# Patient Record
Sex: MALE | Race: OTHER | HISPANIC OR LATINO | URBAN - METROPOLITAN AREA
[De-identification: names, ages, dates, MRNs, and addresses within clinical notes are randomized per-mention and may not be internally consistent; named-entity substitution may affect disease eponyms.]

---

## 2019-01-08 PROBLEM — Z00.00 ENCOUNTER FOR PREVENTIVE HEALTH EXAMINATION: Status: ACTIVE | Noted: 2019-01-08

## 2019-02-07 ENCOUNTER — OUTPATIENT (OUTPATIENT)
Dept: OUTPATIENT SERVICES | Facility: HOSPITAL | Age: 39
LOS: 1 days | End: 2019-02-07
Payer: MEDICAID

## 2019-02-07 ENCOUNTER — APPOINTMENT (OUTPATIENT)
Dept: NUCLEAR MEDICINE | Facility: HOSPITAL | Age: 39
End: 2019-02-07

## 2019-02-07 DIAGNOSIS — M17.31 UNILATERAL POST-TRAUMATIC OSTEOARTHRITIS, RIGHT KNEE: ICD-10-CM

## 2019-02-07 DIAGNOSIS — M16.11 UNILATERAL PRIMARY OSTEOARTHRITIS, RIGHT HIP: ICD-10-CM

## 2019-02-07 PROCEDURE — A9503: CPT

## 2019-02-07 PROCEDURE — 78315 BONE IMAGING 3 PHASE: CPT

## 2019-02-07 PROCEDURE — 78315 BONE IMAGING 3 PHASE: CPT | Mod: 26

## 2019-03-27 ENCOUNTER — OUTPATIENT (OUTPATIENT)
Dept: OUTPATIENT SERVICES | Facility: HOSPITAL | Age: 39
LOS: 1 days | End: 2019-03-27
Payer: MEDICAID

## 2019-03-27 VITALS
OXYGEN SATURATION: 98 % | SYSTOLIC BLOOD PRESSURE: 132 MMHG | HEIGHT: 74 IN | HEART RATE: 70 BPM | WEIGHT: 237 LBS | TEMPERATURE: 98 F | DIASTOLIC BLOOD PRESSURE: 80 MMHG | RESPIRATION RATE: 18 BRPM

## 2019-03-27 DIAGNOSIS — Z98.890 OTHER SPECIFIED POSTPROCEDURAL STATES: Chronic | ICD-10-CM

## 2019-03-27 DIAGNOSIS — Z01.818 ENCOUNTER FOR OTHER PREPROCEDURAL EXAMINATION: ICD-10-CM

## 2019-03-27 DIAGNOSIS — T84.84XA PAIN DUE TO INTERNAL ORTHOPEDIC PROSTHETIC DEVICES, IMPLANTS AND GRAFTS, INITIAL ENCOUNTER: ICD-10-CM

## 2019-03-27 PROCEDURE — G0463: CPT

## 2019-03-27 RX ORDER — SODIUM CHLORIDE 9 MG/ML
3 INJECTION INTRAMUSCULAR; INTRAVENOUS; SUBCUTANEOUS EVERY 8 HOURS
Qty: 0 | Refills: 0 | Status: DISCONTINUED | OUTPATIENT
Start: 2019-04-02 | End: 2019-04-04
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
## 2019-03-27 NOTE — H&P PST ADULT - NEGATIVE CARDIOVASCULAR SYMPTOMS
no chest pain/no peripheral edema/no orthopnea/no paroxysmal nocturnal dyspnea/no palpitations/no dyspnea on exertion/no claudication

## 2019-03-27 NOTE — H&P PST ADULT - NSICDXPROBLEM_GEN_ALL_CORE_FT
PROBLEM DIAGNOSES  Problem: Pain due to internal orthopedic prosthetic devices, implants and grafts, initial encounter  Assessment and Plan: removal of right femoral nail

## 2019-03-28 PROBLEM — I10 ESSENTIAL (PRIMARY) HYPERTENSION: Chronic | Status: ACTIVE | Noted: 2019-03-27

## 2019-04-01 ENCOUNTER — TRANSCRIPTION ENCOUNTER (OUTPATIENT)
Age: 39
End: 2019-04-01

## 2019-04-02 ENCOUNTER — RESULT REVIEW (OUTPATIENT)
Age: 39
End: 2019-04-02

## 2019-04-02 ENCOUNTER — INPATIENT (INPATIENT)
Facility: HOSPITAL | Age: 39
LOS: 1 days | Discharge: HOME | DRG: 481 | End: 2019-04-04
Attending: ORTHOPAEDIC SURGERY | Admitting: ORTHOPAEDIC SURGERY
Payer: MEDICAID

## 2019-04-02 VITALS
WEIGHT: 237 LBS | TEMPERATURE: 98 F | OXYGEN SATURATION: 100 % | HEIGHT: 74 IN | DIASTOLIC BLOOD PRESSURE: 86 MMHG | HEART RATE: 72 BPM | SYSTOLIC BLOOD PRESSURE: 139 MMHG | RESPIRATION RATE: 16 BRPM

## 2019-04-02 DIAGNOSIS — T84.84XA PAIN DUE TO INTERNAL ORTHOPEDIC PROSTHETIC DEVICES, IMPLANTS AND GRAFTS, INITIAL ENCOUNTER: ICD-10-CM

## 2019-04-02 DIAGNOSIS — Z98.890 OTHER SPECIFIED POSTPROCEDURAL STATES: Chronic | ICD-10-CM

## 2019-04-02 LAB
ABO RH CONFIRMATION: SIGNIFICANT CHANGE UP
ALLERGY+IMMUNOLOGY DIAG STUDY NOTE: SIGNIFICANT CHANGE UP
ANION GAP SERPL CALC-SCNC: 10 MMOL/L — SIGNIFICANT CHANGE UP (ref 5–17)
BASOPHILS # BLD AUTO: 0.04 K/UL — SIGNIFICANT CHANGE UP (ref 0–0.2)
BASOPHILS NFR BLD AUTO: 0.2 % — SIGNIFICANT CHANGE UP (ref 0–2)
BUN SERPL-MCNC: 15 MG/DL — SIGNIFICANT CHANGE UP (ref 7–18)
CALCIUM SERPL-MCNC: 7.7 MG/DL — LOW (ref 8.4–10.5)
CHLORIDE SERPL-SCNC: 109 MMOL/L — HIGH (ref 96–108)
CO2 SERPL-SCNC: 20 MMOL/L — LOW (ref 22–31)
CREAT SERPL-MCNC: 1.09 MG/DL — SIGNIFICANT CHANGE UP (ref 0.5–1.3)
EOSINOPHIL # BLD AUTO: 0.05 K/UL — SIGNIFICANT CHANGE UP (ref 0–0.5)
EOSINOPHIL NFR BLD AUTO: 0.3 % — SIGNIFICANT CHANGE UP (ref 0–6)
GLUCOSE SERPL-MCNC: 215 MG/DL — HIGH (ref 70–99)
HCT VFR BLD CALC: 32.3 % — LOW (ref 39–50)
HCT VFR BLD CALC: 34.2 % — LOW (ref 39–50)
HGB BLD-MCNC: 10.5 G/DL — LOW (ref 13–17)
HGB BLD-MCNC: 11.3 G/DL — LOW (ref 13–17)
IMM GRANULOCYTES NFR BLD AUTO: 0.3 % — SIGNIFICANT CHANGE UP (ref 0–1.5)
LYMPHOCYTES # BLD AUTO: 22 % — SIGNIFICANT CHANGE UP (ref 13–44)
LYMPHOCYTES # BLD AUTO: 3.75 K/UL — HIGH (ref 1–3.3)
MCHC RBC-ENTMCNC: 27.7 PG — SIGNIFICANT CHANGE UP (ref 27–34)
MCHC RBC-ENTMCNC: 28 PG — SIGNIFICANT CHANGE UP (ref 27–34)
MCHC RBC-ENTMCNC: 32.5 GM/DL — SIGNIFICANT CHANGE UP (ref 32–36)
MCHC RBC-ENTMCNC: 33 GM/DL — SIGNIFICANT CHANGE UP (ref 32–36)
MCV RBC AUTO: 84.7 FL — SIGNIFICANT CHANGE UP (ref 80–100)
MCV RBC AUTO: 85.2 FL — SIGNIFICANT CHANGE UP (ref 80–100)
MONOCYTES # BLD AUTO: 0.37 K/UL — SIGNIFICANT CHANGE UP (ref 0–0.9)
MONOCYTES NFR BLD AUTO: 2.2 % — SIGNIFICANT CHANGE UP (ref 2–14)
NEUTROPHILS # BLD AUTO: 12.79 K/UL — HIGH (ref 1.8–7.4)
NEUTROPHILS NFR BLD AUTO: 75 % — SIGNIFICANT CHANGE UP (ref 43–77)
NRBC # BLD: 0 /100 WBCS — SIGNIFICANT CHANGE UP (ref 0–0)
NRBC # BLD: 0 /100 WBCS — SIGNIFICANT CHANGE UP (ref 0–0)
PLATELET # BLD AUTO: 305 K/UL — SIGNIFICANT CHANGE UP (ref 150–400)
PLATELET # BLD AUTO: 344 K/UL — SIGNIFICANT CHANGE UP (ref 150–400)
POTASSIUM SERPL-MCNC: 4 MMOL/L — SIGNIFICANT CHANGE UP (ref 3.5–5.3)
POTASSIUM SERPL-SCNC: 4 MMOL/L — SIGNIFICANT CHANGE UP (ref 3.5–5.3)
RBC # BLD: 3.79 M/UL — LOW (ref 4.2–5.8)
RBC # BLD: 4.04 M/UL — LOW (ref 4.2–5.8)
RBC # FLD: 13.3 % — SIGNIFICANT CHANGE UP (ref 10.3–14.5)
RBC # FLD: 13.5 % — SIGNIFICANT CHANGE UP (ref 10.3–14.5)
SODIUM SERPL-SCNC: 139 MMOL/L — SIGNIFICANT CHANGE UP (ref 135–145)
WBC # BLD: 10.15 K/UL — SIGNIFICANT CHANGE UP (ref 3.8–10.5)
WBC # BLD: 17.05 K/UL — HIGH (ref 3.8–10.5)
WBC # FLD AUTO: 10.15 K/UL — SIGNIFICANT CHANGE UP (ref 3.8–10.5)
WBC # FLD AUTO: 17.05 K/UL — HIGH (ref 3.8–10.5)

## 2019-04-02 PROCEDURE — 27356 REMOVE FEMUR LESION/GRAFT: CPT | Mod: AS,59,RT

## 2019-04-02 PROCEDURE — 77071 MNL APPL STRS JT RADIOGRAPHY: CPT

## 2019-04-02 PROCEDURE — 27372 REMOVAL OF FOREIGN BODY: CPT | Mod: AS,59,RT

## 2019-04-02 PROCEDURE — 27087 REMOVE HIP FOREIGN BODY: CPT | Mod: AS,59,RT

## 2019-04-02 PROCEDURE — 88300 SURGICAL PATH GROSS: CPT | Mod: 26

## 2019-04-02 PROCEDURE — 76000 FLUOROSCOPY <1 HR PHYS/QHP: CPT | Mod: 26

## 2019-04-02 PROCEDURE — 20680 REMOVAL OF IMPLANT DEEP: CPT | Mod: AS,59,RT

## 2019-04-02 PROCEDURE — 27067 REMOVE/GRAFT HIP BONE LESION: CPT | Mod: AS,RT

## 2019-04-02 RX ORDER — OXYCODONE AND ACETAMINOPHEN 5; 325 MG/1; MG/1
2 TABLET ORAL EVERY 6 HOURS
Qty: 0 | Refills: 0 | Status: DISCONTINUED | OUTPATIENT
Start: 2019-04-02 | End: 2019-04-04

## 2019-04-02 RX ORDER — HYDROMORPHONE HYDROCHLORIDE 2 MG/ML
0.5 INJECTION INTRAMUSCULAR; INTRAVENOUS; SUBCUTANEOUS
Qty: 0 | Refills: 0 | Status: DISCONTINUED | OUTPATIENT
Start: 2019-04-02 | End: 2019-04-02

## 2019-04-02 RX ORDER — SODIUM CHLORIDE 9 MG/ML
1000 INJECTION INTRAMUSCULAR; INTRAVENOUS; SUBCUTANEOUS
Qty: 0 | Refills: 0 | Status: DISCONTINUED | OUTPATIENT
Start: 2019-04-02 | End: 2019-04-03

## 2019-04-02 RX ORDER — AMLODIPINE BESYLATE 2.5 MG/1
1 TABLET ORAL
Qty: 0 | Refills: 0 | COMMUNITY

## 2019-04-02 RX ORDER — OXYCODONE AND ACETAMINOPHEN 5; 325 MG/1; MG/1
1 TABLET ORAL EVERY 4 HOURS
Qty: 0 | Refills: 0 | Status: DISCONTINUED | OUTPATIENT
Start: 2019-04-02 | End: 2019-04-04

## 2019-04-02 RX ORDER — KETOROLAC TROMETHAMINE 30 MG/ML
15 SYRINGE (ML) INJECTION EVERY 6 HOURS
Qty: 0 | Refills: 0 | Status: DISCONTINUED | OUTPATIENT
Start: 2019-04-02 | End: 2019-04-04

## 2019-04-02 RX ORDER — SODIUM CHLORIDE 9 MG/ML
1000 INJECTION, SOLUTION INTRAVENOUS
Qty: 0 | Refills: 0 | Status: DISCONTINUED | OUTPATIENT
Start: 2019-04-02 | End: 2019-04-02

## 2019-04-02 RX ORDER — DEXTROSE MONOHYDRATE, SODIUM CHLORIDE, AND POTASSIUM CHLORIDE 50; .745; 4.5 G/1000ML; G/1000ML; G/1000ML
1000 INJECTION, SOLUTION INTRAVENOUS
Qty: 0 | Refills: 0 | Status: DISCONTINUED | OUTPATIENT
Start: 2019-04-02 | End: 2019-04-03

## 2019-04-02 RX ORDER — ATORVASTATIN CALCIUM 80 MG/1
1 TABLET, FILM COATED ORAL
Qty: 0 | Refills: 0 | COMMUNITY

## 2019-04-02 RX ORDER — ONDANSETRON 8 MG/1
4 TABLET, FILM COATED ORAL EVERY 6 HOURS
Qty: 0 | Refills: 0 | Status: DISCONTINUED | OUTPATIENT
Start: 2019-04-02 | End: 2019-04-04

## 2019-04-02 RX ADMIN — SODIUM CHLORIDE 3 MILLILITER(S): 9 INJECTION INTRAMUSCULAR; INTRAVENOUS; SUBCUTANEOUS at 08:12

## 2019-04-02 RX ADMIN — HYDROMORPHONE HYDROCHLORIDE 0.5 MILLIGRAM(S): 2 INJECTION INTRAMUSCULAR; INTRAVENOUS; SUBCUTANEOUS at 18:40

## 2019-04-02 RX ADMIN — Medication 15 MILLIGRAM(S): at 18:40

## 2019-04-02 RX ADMIN — OXYCODONE AND ACETAMINOPHEN 2 TABLET(S): 5; 325 TABLET ORAL at 23:52

## 2019-04-02 RX ADMIN — DEXTROSE MONOHYDRATE, SODIUM CHLORIDE, AND POTASSIUM CHLORIDE 150 MILLILITER(S): 50; .745; 4.5 INJECTION, SOLUTION INTRAVENOUS at 23:52

## 2019-04-02 RX ADMIN — SODIUM CHLORIDE 3 MILLILITER(S): 9 INJECTION INTRAMUSCULAR; INTRAVENOUS; SUBCUTANEOUS at 23:23

## 2019-04-02 RX ADMIN — HYDROMORPHONE HYDROCHLORIDE 0.5 MILLIGRAM(S): 2 INJECTION INTRAMUSCULAR; INTRAVENOUS; SUBCUTANEOUS at 18:25

## 2019-04-02 RX ADMIN — Medication 15 MILLIGRAM(S): at 18:25

## 2019-04-02 NOTE — ASU DISCHARGE PLAN (ADULT/PEDIATRIC) - CALL YOUR DOCTOR IF YOU HAVE ANY OF THE FOLLOWING:
Wound/Surgical Site with redness, or foul smelling discharge or pus/Fever greater than (need to indicate Fahrenheit or Celsius)/Numbness, tingling, color or temperature change to extremity/Nausea and vomiting that does not stop/Unable to urinate/Bleeding that does not stop/Swelling that gets worse/Pain not relieved by Medications

## 2019-04-02 NOTE — ASU DISCHARGE PLAN (ADULT/PEDIATRIC) - CARE PROVIDER_API CALL
Dano Angel (MD)  Orthopaedic Surgery; Sports Medicine  [address] [facility], [address]  [place] [id number]  Phone: [phone]  Fax: [phone]  Follow Up Time:

## 2019-04-02 NOTE — BRIEF OPERATIVE NOTE - NSICDXBRIEFPROCEDURE_GEN_ALL_CORE_FT
PROCEDURES:  Removal of internal fixation device from right femoral shaft, open approach 02-Apr-2019 16:23:12  Wilver Kirby

## 2019-04-02 NOTE — ASU DISCHARGE PLAN (ADULT/PEDIATRIC) - ASU DC SPECIAL INSTRUCTIONSFT
Follow-up with Dr. Angel in ONE WEEK at [phone]     Non-weight bearing of the right leg with crutches

## 2019-04-03 LAB
ALBUMIN SERPL ELPH-MCNC: 2.8 G/DL — LOW (ref 3.5–5)
ALBUMIN SERPL ELPH-MCNC: 2.9 G/DL — LOW (ref 3.5–5)
ALP SERPL-CCNC: 45 U/L — SIGNIFICANT CHANGE UP (ref 40–120)
ALP SERPL-CCNC: 48 U/L — SIGNIFICANT CHANGE UP (ref 40–120)
ALT FLD-CCNC: 31 U/L DA — SIGNIFICANT CHANGE UP (ref 10–60)
ALT FLD-CCNC: 34 U/L DA — SIGNIFICANT CHANGE UP (ref 10–60)
ANION GAP SERPL CALC-SCNC: 4 MMOL/L — LOW (ref 5–17)
ANION GAP SERPL CALC-SCNC: 5 MMOL/L — SIGNIFICANT CHANGE UP (ref 5–17)
ANION GAP SERPL CALC-SCNC: 6 MMOL/L — SIGNIFICANT CHANGE UP (ref 5–17)
APPEARANCE UR: CLEAR — SIGNIFICANT CHANGE UP
AST SERPL-CCNC: 34 U/L — SIGNIFICANT CHANGE UP (ref 10–40)
AST SERPL-CCNC: 36 U/L — SIGNIFICANT CHANGE UP (ref 10–40)
BASOPHILS # BLD AUTO: 0.02 K/UL — SIGNIFICANT CHANGE UP (ref 0–0.2)
BASOPHILS NFR BLD AUTO: 0.1 % — SIGNIFICANT CHANGE UP (ref 0–2)
BILIRUB SERPL-MCNC: 0.3 MG/DL — SIGNIFICANT CHANGE UP (ref 0.2–1.2)
BILIRUB SERPL-MCNC: 0.3 MG/DL — SIGNIFICANT CHANGE UP (ref 0.2–1.2)
BILIRUB UR-MCNC: NEGATIVE — SIGNIFICANT CHANGE UP
BUN SERPL-MCNC: 12 MG/DL — SIGNIFICANT CHANGE UP (ref 7–18)
BUN SERPL-MCNC: 13 MG/DL — SIGNIFICANT CHANGE UP (ref 7–18)
BUN SERPL-MCNC: 13 MG/DL — SIGNIFICANT CHANGE UP (ref 7–18)
CALCIUM SERPL-MCNC: 7.4 MG/DL — LOW (ref 8.4–10.5)
CALCIUM SERPL-MCNC: 7.7 MG/DL — LOW (ref 8.4–10.5)
CALCIUM SERPL-MCNC: 7.7 MG/DL — LOW (ref 8.4–10.5)
CHLORIDE SERPL-SCNC: 108 MMOL/L — SIGNIFICANT CHANGE UP (ref 96–108)
CHLORIDE SERPL-SCNC: 108 MMOL/L — SIGNIFICANT CHANGE UP (ref 96–108)
CHLORIDE SERPL-SCNC: 113 MMOL/L — HIGH (ref 96–108)
CHOLEST SERPL-MCNC: 137 MG/DL — SIGNIFICANT CHANGE UP (ref 10–199)
CK MB BLD-MCNC: 0.2 % — SIGNIFICANT CHANGE UP (ref 0–3.5)
CK MB BLD-MCNC: 0.3 % — SIGNIFICANT CHANGE UP (ref 0–3.5)
CK MB BLD-MCNC: 0.4 % — SIGNIFICANT CHANGE UP (ref 0–3.5)
CK MB CFR SERPL CALC: 3.8 NG/ML — HIGH (ref 0–3.6)
CK MB CFR SERPL CALC: 4.9 NG/ML — HIGH (ref 0–3.6)
CK MB CFR SERPL CALC: 5.1 NG/ML — HIGH (ref 0–3.6)
CK SERPL-CCNC: 1370 U/L — HIGH (ref 35–232)
CK SERPL-CCNC: 1459 U/L — HIGH (ref 35–232)
CK SERPL-CCNC: 1539 U/L — HIGH (ref 35–232)
CO2 SERPL-SCNC: 24 MMOL/L — SIGNIFICANT CHANGE UP (ref 22–31)
CO2 SERPL-SCNC: 25 MMOL/L — SIGNIFICANT CHANGE UP (ref 22–31)
CO2 SERPL-SCNC: 25 MMOL/L — SIGNIFICANT CHANGE UP (ref 22–31)
COLOR SPEC: YELLOW — SIGNIFICANT CHANGE UP
CREAT SERPL-MCNC: 0.79 MG/DL — SIGNIFICANT CHANGE UP (ref 0.5–1.3)
CREAT SERPL-MCNC: 0.9 MG/DL — SIGNIFICANT CHANGE UP (ref 0.5–1.3)
CREAT SERPL-MCNC: 1 MG/DL — SIGNIFICANT CHANGE UP (ref 0.5–1.3)
DIFF PNL FLD: NEGATIVE — SIGNIFICANT CHANGE UP
EOSINOPHIL # BLD AUTO: 0 K/UL — SIGNIFICANT CHANGE UP (ref 0–0.5)
EOSINOPHIL NFR BLD AUTO: 0 % — SIGNIFICANT CHANGE UP (ref 0–6)
GLUCOSE SERPL-MCNC: 104 MG/DL — HIGH (ref 70–99)
GLUCOSE SERPL-MCNC: 125 MG/DL — HIGH (ref 70–99)
GLUCOSE SERPL-MCNC: 156 MG/DL — HIGH (ref 70–99)
GLUCOSE UR QL: NEGATIVE — SIGNIFICANT CHANGE UP
HCT VFR BLD CALC: 25.2 % — LOW (ref 39–50)
HCT VFR BLD CALC: 26.6 % — LOW (ref 39–50)
HCT VFR BLD CALC: 28.3 % — LOW (ref 39–50)
HDLC SERPL-MCNC: 23 MG/DL — LOW
HGB BLD-MCNC: 8.2 G/DL — LOW (ref 13–17)
HGB BLD-MCNC: 8.7 G/DL — LOW (ref 13–17)
HGB BLD-MCNC: 9.3 G/DL — LOW (ref 13–17)
IMM GRANULOCYTES NFR BLD AUTO: 0.4 % — SIGNIFICANT CHANGE UP (ref 0–1.5)
KETONES UR-MCNC: NEGATIVE — SIGNIFICANT CHANGE UP
LEUKOCYTE ESTERASE UR-ACNC: NEGATIVE — SIGNIFICANT CHANGE UP
LIPID PNL WITH DIRECT LDL SERPL: 97 MG/DL — SIGNIFICANT CHANGE UP
LYMPHOCYTES # BLD AUTO: 16 % — SIGNIFICANT CHANGE UP (ref 13–44)
LYMPHOCYTES # BLD AUTO: 2.36 K/UL — SIGNIFICANT CHANGE UP (ref 1–3.3)
MAGNESIUM SERPL-MCNC: 1.7 MG/DL — SIGNIFICANT CHANGE UP (ref 1.6–2.6)
MAGNESIUM SERPL-MCNC: 1.8 MG/DL — SIGNIFICANT CHANGE UP (ref 1.6–2.6)
MCHC RBC-ENTMCNC: 27.4 PG — SIGNIFICANT CHANGE UP (ref 27–34)
MCHC RBC-ENTMCNC: 27.5 PG — SIGNIFICANT CHANGE UP (ref 27–34)
MCHC RBC-ENTMCNC: 27.6 PG — SIGNIFICANT CHANGE UP (ref 27–34)
MCHC RBC-ENTMCNC: 32.5 GM/DL — SIGNIFICANT CHANGE UP (ref 32–36)
MCHC RBC-ENTMCNC: 32.7 GM/DL — SIGNIFICANT CHANGE UP (ref 32–36)
MCHC RBC-ENTMCNC: 32.9 GM/DL — SIGNIFICANT CHANGE UP (ref 32–36)
MCV RBC AUTO: 83.2 FL — SIGNIFICANT CHANGE UP (ref 80–100)
MCV RBC AUTO: 84.2 FL — SIGNIFICANT CHANGE UP (ref 80–100)
MCV RBC AUTO: 84.8 FL — SIGNIFICANT CHANGE UP (ref 80–100)
MONOCYTES # BLD AUTO: 1.43 K/UL — HIGH (ref 0–0.9)
MONOCYTES NFR BLD AUTO: 9.7 % — SIGNIFICANT CHANGE UP (ref 2–14)
NEUTROPHILS # BLD AUTO: 10.88 K/UL — HIGH (ref 1.8–7.4)
NEUTROPHILS NFR BLD AUTO: 73.8 % — SIGNIFICANT CHANGE UP (ref 43–77)
NITRITE UR-MCNC: NEGATIVE — SIGNIFICANT CHANGE UP
NRBC # BLD: 0 /100 WBCS — SIGNIFICANT CHANGE UP (ref 0–0)
PH UR: 8 — SIGNIFICANT CHANGE UP (ref 5–8)
PHOSPHATE SERPL-MCNC: 1.4 MG/DL — LOW (ref 2.5–4.5)
PHOSPHATE SERPL-MCNC: 1.9 MG/DL — LOW (ref 2.5–4.5)
PLATELET # BLD AUTO: 221 K/UL — SIGNIFICANT CHANGE UP (ref 150–400)
PLATELET # BLD AUTO: 268 K/UL — SIGNIFICANT CHANGE UP (ref 150–400)
PLATELET # BLD AUTO: 273 K/UL — SIGNIFICANT CHANGE UP (ref 150–400)
POTASSIUM SERPL-MCNC: 4 MMOL/L — SIGNIFICANT CHANGE UP (ref 3.5–5.3)
POTASSIUM SERPL-MCNC: 4.3 MMOL/L — SIGNIFICANT CHANGE UP (ref 3.5–5.3)
POTASSIUM SERPL-MCNC: 4.5 MMOL/L — SIGNIFICANT CHANGE UP (ref 3.5–5.3)
POTASSIUM SERPL-SCNC: 4 MMOL/L — SIGNIFICANT CHANGE UP (ref 3.5–5.3)
POTASSIUM SERPL-SCNC: 4.3 MMOL/L — SIGNIFICANT CHANGE UP (ref 3.5–5.3)
POTASSIUM SERPL-SCNC: 4.5 MMOL/L — SIGNIFICANT CHANGE UP (ref 3.5–5.3)
PROT SERPL-MCNC: 5.5 G/DL — LOW (ref 6–8.3)
PROT SERPL-MCNC: 5.9 G/DL — LOW (ref 6–8.3)
PROT UR-MCNC: NEGATIVE — SIGNIFICANT CHANGE UP
RBC # BLD: 2.97 M/UL — LOW (ref 4.2–5.8)
RBC # BLD: 3.16 M/UL — LOW (ref 4.2–5.8)
RBC # BLD: 3.4 M/UL — LOW (ref 4.2–5.8)
RBC # FLD: 13.3 % — SIGNIFICANT CHANGE UP (ref 10.3–14.5)
RBC # FLD: 13.4 % — SIGNIFICANT CHANGE UP (ref 10.3–14.5)
RBC # FLD: 13.7 % — SIGNIFICANT CHANGE UP (ref 10.3–14.5)
SODIUM SERPL-SCNC: 138 MMOL/L — SIGNIFICANT CHANGE UP (ref 135–145)
SODIUM SERPL-SCNC: 138 MMOL/L — SIGNIFICANT CHANGE UP (ref 135–145)
SODIUM SERPL-SCNC: 142 MMOL/L — SIGNIFICANT CHANGE UP (ref 135–145)
SP GR SPEC: 1.01 — SIGNIFICANT CHANGE UP (ref 1.01–1.02)
TOTAL CHOLESTEROL/HDL RATIO MEASUREMENT: 6 RATIO — SIGNIFICANT CHANGE UP (ref 3.4–9.6)
TRIGL SERPL-MCNC: 84 MG/DL — SIGNIFICANT CHANGE UP (ref 10–149)
TROPONIN I SERPL-MCNC: <0.015 NG/ML — SIGNIFICANT CHANGE UP (ref 0–0.04)
TROPONIN I SERPL-MCNC: <0.015 NG/ML — SIGNIFICANT CHANGE UP (ref 0–0.04)
TSH SERPL-MCNC: 0.39 UU/ML — SIGNIFICANT CHANGE UP (ref 0.34–4.82)
UROBILINOGEN FLD QL: NEGATIVE — SIGNIFICANT CHANGE UP
WBC # BLD: 11.63 K/UL — HIGH (ref 3.8–10.5)
WBC # BLD: 14.75 K/UL — HIGH (ref 3.8–10.5)
WBC # BLD: 16.51 K/UL — HIGH (ref 3.8–10.5)
WBC # FLD AUTO: 11.63 K/UL — HIGH (ref 3.8–10.5)
WBC # FLD AUTO: 14.75 K/UL — HIGH (ref 3.8–10.5)
WBC # FLD AUTO: 16.51 K/UL — HIGH (ref 3.8–10.5)

## 2019-04-03 PROCEDURE — 99222 1ST HOSP IP/OBS MODERATE 55: CPT

## 2019-04-03 RX ORDER — ENOXAPARIN SODIUM 100 MG/ML
40 INJECTION SUBCUTANEOUS EVERY 24 HOURS
Qty: 0 | Refills: 0 | Status: DISCONTINUED | OUTPATIENT
Start: 2019-04-03 | End: 2019-04-04

## 2019-04-03 RX ORDER — ENOXAPARIN SODIUM 100 MG/ML
40 INJECTION SUBCUTANEOUS EVERY 24 HOURS
Qty: 0 | Refills: 0 | Status: DISCONTINUED | OUTPATIENT
Start: 2019-04-03 | End: 2019-04-03

## 2019-04-03 RX ORDER — FUROSEMIDE 40 MG
20 TABLET ORAL ONCE
Qty: 0 | Refills: 0 | Status: COMPLETED | OUTPATIENT
Start: 2019-04-03 | End: 2019-04-03

## 2019-04-03 RX ORDER — SODIUM CHLORIDE 9 MG/ML
1000 INJECTION, SOLUTION INTRAVENOUS
Qty: 0 | Refills: 0 | Status: DISCONTINUED | OUTPATIENT
Start: 2019-04-03 | End: 2019-04-04

## 2019-04-03 RX ORDER — POTASSIUM PHOSPHATE, MONOBASIC POTASSIUM PHOSPHATE, DIBASIC 236; 224 MG/ML; MG/ML
30 INJECTION, SOLUTION INTRAVENOUS ONCE
Qty: 0 | Refills: 0 | Status: DISCONTINUED | OUTPATIENT
Start: 2019-04-03 | End: 2019-04-03

## 2019-04-03 RX ORDER — SODIUM CHLORIDE 9 MG/ML
1000 INJECTION, SOLUTION INTRAVENOUS
Qty: 0 | Refills: 0 | Status: DISCONTINUED | OUTPATIENT
Start: 2019-04-04 | End: 2019-04-04

## 2019-04-03 RX ORDER — POTASSIUM PHOSPHATE, MONOBASIC POTASSIUM PHOSPHATE, DIBASIC 236; 224 MG/ML; MG/ML
15 INJECTION, SOLUTION INTRAVENOUS ONCE
Qty: 0 | Refills: 0 | Status: COMPLETED | OUTPATIENT
Start: 2019-04-03 | End: 2019-04-03

## 2019-04-03 RX ORDER — SODIUM CHLORIDE 9 MG/ML
1000 INJECTION INTRAMUSCULAR; INTRAVENOUS; SUBCUTANEOUS ONCE
Qty: 0 | Refills: 0 | Status: COMPLETED | OUTPATIENT
Start: 2019-04-03 | End: 2019-04-03

## 2019-04-03 RX ADMIN — OXYCODONE AND ACETAMINOPHEN 2 TABLET(S): 5; 325 TABLET ORAL at 21:09

## 2019-04-03 RX ADMIN — SODIUM CHLORIDE 3 MILLILITER(S): 9 INJECTION INTRAMUSCULAR; INTRAVENOUS; SUBCUTANEOUS at 21:09

## 2019-04-03 RX ADMIN — OXYCODONE AND ACETAMINOPHEN 2 TABLET(S): 5; 325 TABLET ORAL at 15:11

## 2019-04-03 RX ADMIN — OXYCODONE AND ACETAMINOPHEN 2 TABLET(S): 5; 325 TABLET ORAL at 08:02

## 2019-04-03 RX ADMIN — OXYCODONE AND ACETAMINOPHEN 2 TABLET(S): 5; 325 TABLET ORAL at 01:25

## 2019-04-03 RX ADMIN — Medication 20 MILLIGRAM(S): at 20:18

## 2019-04-03 RX ADMIN — SODIUM CHLORIDE 3 MILLILITER(S): 9 INJECTION INTRAMUSCULAR; INTRAVENOUS; SUBCUTANEOUS at 06:31

## 2019-04-03 RX ADMIN — OXYCODONE AND ACETAMINOPHEN 2 TABLET(S): 5; 325 TABLET ORAL at 06:39

## 2019-04-03 RX ADMIN — SODIUM CHLORIDE 1000 MILLILITER(S): 9 INJECTION INTRAMUSCULAR; INTRAVENOUS; SUBCUTANEOUS at 10:31

## 2019-04-03 RX ADMIN — Medication 15 MILLIGRAM(S): at 04:42

## 2019-04-03 RX ADMIN — ENOXAPARIN SODIUM 40 MILLIGRAM(S): 100 INJECTION SUBCUTANEOUS at 09:18

## 2019-04-03 RX ADMIN — SODIUM CHLORIDE 125 MILLILITER(S): 9 INJECTION, SOLUTION INTRAVENOUS at 20:18

## 2019-04-03 RX ADMIN — OXYCODONE AND ACETAMINOPHEN 2 TABLET(S): 5; 325 TABLET ORAL at 20:18

## 2019-04-03 RX ADMIN — OXYCODONE AND ACETAMINOPHEN 2 TABLET(S): 5; 325 TABLET ORAL at 14:13

## 2019-04-03 RX ADMIN — Medication 15 MILLIGRAM(S): at 02:55

## 2019-04-03 RX ADMIN — SODIUM CHLORIDE 125 MILLILITER(S): 9 INJECTION, SOLUTION INTRAVENOUS at 06:30

## 2019-04-03 RX ADMIN — SODIUM CHLORIDE 3 MILLILITER(S): 9 INJECTION INTRAMUSCULAR; INTRAVENOUS; SUBCUTANEOUS at 14:09

## 2019-04-03 RX ADMIN — POTASSIUM PHOSPHATE, MONOBASIC POTASSIUM PHOSPHATE, DIBASIC 62.5 MILLIMOLE(S): 236; 224 INJECTION, SOLUTION INTRAVENOUS at 06:30

## 2019-04-03 RX ADMIN — Medication 20 MILLIGRAM(S): at 16:28

## 2019-04-03 NOTE — PROGRESS NOTE ADULT - ATTENDING COMMENTS
Patient seen/evaluated at bedside 4/3/2019. I agree with the resident progress note/outlined plan of care. My independent findings and conclusions are documented.

## 2019-04-03 NOTE — PHYSICAL THERAPY INITIAL EVALUATION ADULT - GENERAL OBSERVATIONS, REHAB EVAL
pt aox4, s/p R hip removal of hardware, resident MD rounding clear to mobilize, crutch dispense, safe I crutch adl and ambulation NWB RLE

## 2019-04-03 NOTE — PROGRESS NOTE ADULT - ASSESSMENT
A 37 YO male, PMH of HTN ( recently diagnosed 7-8 months back on Norvasc ), HLD ( X 2 years) admitted to [facility] for removal of internal fixation device from right femoral shaft, internal medicine consult called for persistent tachycardia.     1) Tachycardia  sinus tach likely related to symptomatic anemia from acute blood loss and pain, now resolved  TTE pending  Patient was transfused 1U PRBC today, check PT CBC  no evidence of active bleeding now      2) Acute blood loss anemia  plan as above    3) Rhabdomyolysis  CK trending up to 1459, c/w IV Hydration with LR, Trend Ck QD. no evidence of YULIANA, check UA    4) Post op Care  pain management and further care per ortho       5) DVT prophylaxis  recommend to start DVT ppx if there is no contraindication from ortho perspective.    Plan discussed with DR Marin, pt and surgical house officer Dr Russ.

## 2019-04-03 NOTE — PHYSICAL THERAPY INITIAL EVALUATION ADULT - IMPAIRMENTS FOUND, PT EVAL
ROM/aerobic capacity/endurance/gait, locomotion, and balance/muscle strength/joint integrity and mobility/gross motor

## 2019-04-03 NOTE — CHART NOTE - NSCHARTNOTEFT_GEN_A_CORE
Pt with tachycardia, P150 on Telemetry  Pt denies chest pain    EKG - , possible inferior infarct  Medical consult now  Obtain CBC, CMP, Cardiac enzyme, Ca, Mg, Phos  Close monitoring

## 2019-04-03 NOTE — PROGRESS NOTE ADULT - SUBJECTIVE AND OBJECTIVE BOX
Pt Name: JUAN NUNEZREYES  MRN: [id number]    ORTHOPEDICS    Orthopedic diagnosis: s/p removal hardware rt femur POD#1, post-operative acute blood loss anemia    38yMaleHPI:  pt with minimal pain of right hip, rated 2/10 right now after receiving pain meds.  overnight, medicine ordered ECHO for tachycardia and 1unit prbc transfusion for h/h drop.  This am hemoglobin 8.6. On LR IV fluids with NS bolus for rhabdomyolysis, CK was 1459.  Pt denies Chest pain, SOB, dyspnea, paresthesias, N/V/D, abdominal pain, syncope, or pain anywhere else.   Denies h/a, dizziness.    T(C): 37.2 (04-03-19 @ 06:45), Max: 37.2 (04-03-19 @ 06:45)  HR: 112 (04-03-19 @ 06:45) (91 - 114)  BP: 120/61 (04-03-19 @ 06:45) (103/61 - 135/83)  RR: 18 (04-03-19 @ 06:45) (12 - 19)  SpO2: 98% (04-03-19 @ 06:45) (95% - 100%)    PHYSICAL EXAM:    Gen: well developed, well nourished, comfortable  Musculoskeletal:    [ X ] RIGHT    [   ] LEFT       [   ] UPPER EXTREMITY   [X ] LOWER EXTREMITY  Rt hip dressing c/d/i  skin pink and warm throughout  no drainage  compartments soft  no ct calves soft  2+pulses nvi  silt with good <2sec cap refill  neg homans sign      LABS:                        8.7    14.75 )-----------( 273      ( 03 Apr 2019 07:20 )             26.6     04-03    138  |  108  |  13  ----------------------------<  125<H>  4.3   |  25  |  0.90    Ca    7.7<L>      03 Apr 2019 07:20  Phos  1.9     04-03  Mg     1.8     04-03    TPro  5.5<L>  /  Alb  2.8<L>  /  TBili  0.3  /  DBili  x   /  AST  34  /  ALT  31  /  AlkPhos  45  04-03    serum creatinine kinase 1459 (4/3/19 @6am)<-1370 (4/3/19 @3am)    IMPRESSION: Pt is a  38y Male with s/p removal hardware rt femur with intermittent tachycardia, acute loss anemia post-operative    PLAN:  -  1unit prbc transfusion ordered for this am  -  IV fluid hydration. NS bolus 1L ordered. with LR@125cc/hr order  -  DVT prophylaxis with lovenox 40mg sc daily  -  Daily PT- NWB of the Rt hip with crutches  -  Case d/w -> agrees with plan  -  Pt is orthopedically stable for discharge.   -  TTE ordered for today as per medicine for tachycardia  -  2pm CBC ordered, possible transfusion later today Pt Name: JUAN NUNEZREYES  MRN: [id number]    ORTHOPEDICS    Orthopedic diagnosis: s/p removal hardware rt femur POD#1, post-operative acute blood loss anemia    38yMaleHPI:  pt with minimal pain of right hip, rated 2/10 right now after receiving pain meds.  overnight, medicine ordered ECHO for tachycardia and 1unit prbc transfusion for h/h drop.  This am hemoglobin 8.6. On LR IV fluids with NS bolus for rhabdomyolysis, CK was 1459.  Pt denies Chest pain, SOB, dyspnea, paresthesias, N/V/D, abdominal pain, syncope, or pain anywhere else.   Denies h/a, dizziness.    T(C): 37.2 (04-03-19 @ 06:45), Max: 37.2 (04-03-19 @ 06:45)  HR: 112 (04-03-19 @ 06:45) (91 - 114)  BP: 120/61 (04-03-19 @ 06:45) (103/61 - 135/83)  RR: 18 (04-03-19 @ 06:45) (12 - 19)  SpO2: 98% (04-03-19 @ 06:45) (95% - 100%)    PHYSICAL EXAM:    Gen: well developed, well nourished, comfortable  Musculoskeletal:    [ X ] RIGHT    [   ] LEFT       [   ] UPPER EXTREMITY   [X ] LOWER EXTREMITY  Rt hip dressing c/d/i  skin pink and warm throughout  no drainage  compartments soft  no ct calves soft  2+pulses nvi  silt with good <2sec cap refill  neg homans sign      LABS:                        8.7    14.75 )-----------( 273      ( 03 Apr 2019 07:20 )             26.6     04-03    138  |  108  |  13  ----------------------------<  125<H>  4.3   |  25  |  0.90    Ca    7.7<L>      03 Apr 2019 07:20  Phos  1.9     04-03  Mg     1.8     04-03    TPro  5.5<L>  /  Alb  2.8<L>  /  TBili  0.3  /  DBili  x   /  AST  34  /  ALT  31  /  AlkPhos  45  04-03    serum creatinine kinase 1459 (4/3/19 @6am)<-1370 (4/3/19 @3am)    IMPRESSION: Pt is a  38y Male with s/p removal hardware rt femur with intermittent tachycardia, acute loss anemia post-operative, rhabdomyolysis    PLAN:  -  Anemia, acute blood loss: 1unit prbc transfusion ordered for this am  -  Rhabdo:  IV fluid hydration. NS bolus 1L ordered. with LR@125cc/hr order  -  DVT prophylaxis with lovenox 40mg sc daily  -  Daily PT- NWB of the Rt hip with crutches  -  Case d/w -> agrees with plan  -  Pt is orthopedically stable for discharge.   -  TTE ordered for today as per medicine for tachycardia  -  2pm CBC ordered, possible transfusion later today  - monitor creatinine level and serum CK and h/h  -
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
## 2019-04-03 NOTE — CONSULT NOTE ADULT - ASSESSMENT
A 37 YO male, PMH of HTN ( recently diagnosed 7-8 months back on Norvasc ), HLD ( X 2 years) admitted to [facility] for removal of internal fixation device from right femoral shaft, internal medicine consult called for persistent tachycardia.     1) Tachycardia  -Pt has sinus tachycardia ranging from 110-140, with HR trending down to 90s while asleep  -could be secondary to multiple etiologies- Post op pain, due to acute blood loss anemia, post anesthesia medications S/E, arrythmia or sepsis.   -EKG s/o sinus tachycardia @ 102 , with low voltage QRS with Q wave in lead III, non specific T wave changes in inferior leads which is unchanged from previous EKG done in March 2019.  - Cardiac enzymes X1 negative, will trend CE  - monitor on telemetry  - Given pt has EBL of 1500cc may be he is volume depleted, received crystalloids but still tachycardic, would recommend to monitor Q4h for now and if there is persistent drop in H/H, may benefit from PRBC transfusion.  - TSH- WNL  - Will get Echo, lipid profile  - would hold off any ASA, statin or beta blocker at this time as it do not seem to be related to ischemia.  - Would defer doing D dimer which is expected to be elevated post op and clinically pt has no tachypnea or hypoxia.       2) Acute blood loss anemia  Pt has Hb of 14 in March 2019 and today Hb was 9.3, due to acute blood loss intra op  Also there may be component of dilutional due to IV fluids administartion.  recommend to check CBC in 4 hours and if there is drop in H/H or if there is no change in H/.H and pt remains tachycardic , would consider transfusing 1 PRBC.    3) Rhabdomyolysis  Pt has elevated CK levels of 1370.  would hydrate the pt with LR @ 125 cc/hr  check Ck levels in AM    4) Post op Care  pain management and further care per ortho       5) DVT prophylaxis  recommend to start DVT ppx if there is no contraindication from ortho perspective. A 39 YO male, PMH of HTN ( recently diagnosed 7-8 months back on Norvasc ), HLD ( X 2 years) admitted to [facility] for removal of internal fixation device from right femoral shaft, internal medicine consult called for persistent tachycardia.     1) Tachycardia  -Pt has sinus tachycardia ranging from 110-140, with HR trending down to 90s while asleep  -could be secondary to multiple etiologies- Post op pain, due to acute blood loss anemia, post anesthesia medications S/E, arrythmia or sepsis.   -EKG s/o sinus tachycardia @ 102 , with low voltage QRS with Q wave in lead III, non specific T wave changes in inferior leads which is unchanged from previous EKG done in March 2019.  - Cardiac enzymes X1 negative, will trend CE  - monitor on telemetry  - Given pt has EBL of 1500cc may be he is volume depleted, received crystalloids but still tachycardic, would recommend to monitor Q4h for now and if there is persistent drop in H/H, may benefit from PRBC transfusion.  - TSH- WNL  - Will get Echo, lipid profile  - would hold off any ASA, statin or beta blocker at this time as it do not seem to be related to ischemia.  - Would defer doing D dimer which is expected to be elevated post op and clinically pt has no tachypnea or hypoxia.       2) Acute blood loss anemia  Pt has Hb of 14 in March 2019 and today Hb was 9.3, due to acute blood loss intra op  Also there may be component of dilutional due to IV fluids administartion.  recommend to check CBC in 4 hours and if there is drop in H/H or if there is no change in H/.H and pt remains tachycardic , would consider transfusing 1 PRBC.    3) Rhabdomyolysis  Pt has elevated CK levels of 1370.  would hydrate the pt with LR @ 125 cc/hr  check Ck levels in AM    4) Post op Care  pain management and further care per ortho       5) DVT prophylaxis  recommend to start DVT ppx if there is no contraindication from ortho perspective.    Plan discussed with DR Marin, pt and surgical house officer Dr Russ.
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
## 2019-04-04 ENCOUNTER — TRANSCRIPTION ENCOUNTER (OUTPATIENT)
Age: 39
End: 2019-04-04

## 2019-04-04 VITALS
TEMPERATURE: 99 F | OXYGEN SATURATION: 98 % | HEART RATE: 99 BPM | SYSTOLIC BLOOD PRESSURE: 123 MMHG | DIASTOLIC BLOOD PRESSURE: 64 MMHG | RESPIRATION RATE: 17 BRPM

## 2019-04-04 LAB
ANION GAP SERPL CALC-SCNC: 4 MMOL/L — LOW (ref 5–17)
BUN SERPL-MCNC: 13 MG/DL — SIGNIFICANT CHANGE UP (ref 7–18)
CALCIUM SERPL-MCNC: 8.1 MG/DL — LOW (ref 8.4–10.5)
CHLORIDE SERPL-SCNC: 110 MMOL/L — HIGH (ref 96–108)
CK MB BLD-MCNC: 0.1 % — SIGNIFICANT CHANGE UP (ref 0–3.5)
CK MB CFR SERPL CALC: 1.5 NG/ML — SIGNIFICANT CHANGE UP (ref 0–3.6)
CK SERPL-CCNC: 1449 U/L — HIGH (ref 35–232)
CO2 SERPL-SCNC: 27 MMOL/L — SIGNIFICANT CHANGE UP (ref 22–31)
CREAT SERPL-MCNC: 0.88 MG/DL — SIGNIFICANT CHANGE UP (ref 0.5–1.3)
GLUCOSE SERPL-MCNC: 95 MG/DL — SIGNIFICANT CHANGE UP (ref 70–99)
HCT VFR BLD CALC: 31.9 % — LOW (ref 39–50)
HGB BLD-MCNC: 10.6 G/DL — LOW (ref 13–17)
MCHC RBC-ENTMCNC: 28.4 PG — SIGNIFICANT CHANGE UP (ref 27–34)
MCHC RBC-ENTMCNC: 33.2 GM/DL — SIGNIFICANT CHANGE UP (ref 32–36)
MCV RBC AUTO: 85.5 FL — SIGNIFICANT CHANGE UP (ref 80–100)
NRBC # BLD: 0 /100 WBCS — SIGNIFICANT CHANGE UP (ref 0–0)
PCP SPEC-MCNC: SIGNIFICANT CHANGE UP
PLATELET # BLD AUTO: 218 K/UL — SIGNIFICANT CHANGE UP (ref 150–400)
POTASSIUM SERPL-MCNC: 3.9 MMOL/L — SIGNIFICANT CHANGE UP (ref 3.5–5.3)
POTASSIUM SERPL-SCNC: 3.9 MMOL/L — SIGNIFICANT CHANGE UP (ref 3.5–5.3)
RBC # BLD: 3.73 M/UL — LOW (ref 4.2–5.8)
RBC # FLD: 13.6 % — SIGNIFICANT CHANGE UP (ref 10.3–14.5)
SODIUM SERPL-SCNC: 141 MMOL/L — SIGNIFICANT CHANGE UP (ref 135–145)
TSH SERPL-MCNC: 1.11 UU/ML — SIGNIFICANT CHANGE UP (ref 0.34–4.82)
WBC # BLD: 9.08 K/UL — SIGNIFICANT CHANGE UP (ref 3.8–10.5)
WBC # FLD AUTO: 9.08 K/UL — SIGNIFICANT CHANGE UP (ref 3.8–10.5)

## 2019-04-04 PROCEDURE — 99223 1ST HOSP IP/OBS HIGH 75: CPT

## 2019-04-04 PROCEDURE — 99233 SBSQ HOSP IP/OBS HIGH 50: CPT | Mod: GC

## 2019-04-04 RX ORDER — ASCORBIC ACID 60 MG
1 TABLET,CHEWABLE ORAL
Qty: 21 | Refills: 0 | OUTPATIENT
Start: 2019-04-04

## 2019-04-04 RX ORDER — FERROUS SULFATE 325(65) MG
1 TABLET ORAL
Qty: 30 | Refills: 0 | OUTPATIENT
Start: 2019-04-04

## 2019-04-04 RX ORDER — SENNA PLUS 8.6 MG/1
1 TABLET ORAL
Qty: 15 | Refills: 0 | OUTPATIENT
Start: 2019-04-04 | End: 2019-04-18

## 2019-04-04 RX ORDER — INFLUENZA VIRUS VACCINE 15; 15; 15; 15 UG/.5ML; UG/.5ML; UG/.5ML; UG/.5ML
0.5 SUSPENSION INTRAMUSCULAR ONCE
Qty: 0 | Refills: 0 | Status: DISCONTINUED | OUTPATIENT
Start: 2019-04-04 | End: 2019-04-04

## 2019-04-04 RX ORDER — ASPIRIN/CALCIUM CARB/MAGNESIUM 324 MG
1 TABLET ORAL
Qty: 60 | Refills: 0 | OUTPATIENT
Start: 2019-04-04 | End: 2019-05-03

## 2019-04-04 RX ADMIN — Medication 15 MILLIGRAM(S): at 14:30

## 2019-04-04 RX ADMIN — OXYCODONE AND ACETAMINOPHEN 2 TABLET(S): 5; 325 TABLET ORAL at 10:00

## 2019-04-04 RX ADMIN — OXYCODONE AND ACETAMINOPHEN 2 TABLET(S): 5; 325 TABLET ORAL at 16:14

## 2019-04-04 RX ADMIN — SODIUM CHLORIDE 3 MILLILITER(S): 9 INJECTION INTRAMUSCULAR; INTRAVENOUS; SUBCUTANEOUS at 13:06

## 2019-04-04 RX ADMIN — OXYCODONE AND ACETAMINOPHEN 2 TABLET(S): 5; 325 TABLET ORAL at 09:01

## 2019-04-04 RX ADMIN — Medication 15 MILLIGRAM(S): at 14:02

## 2019-04-04 RX ADMIN — OXYCODONE AND ACETAMINOPHEN 2 TABLET(S): 5; 325 TABLET ORAL at 02:40

## 2019-04-04 RX ADMIN — OXYCODONE AND ACETAMINOPHEN 2 TABLET(S): 5; 325 TABLET ORAL at 17:14

## 2019-04-04 RX ADMIN — OXYCODONE AND ACETAMINOPHEN 2 TABLET(S): 5; 325 TABLET ORAL at 03:37

## 2019-04-04 RX ADMIN — ENOXAPARIN SODIUM 40 MILLIGRAM(S): 100 INJECTION SUBCUTANEOUS at 09:02

## 2019-04-04 NOTE — DISCHARGE NOTE NURSING/CASE MANAGEMENT/SOCIAL WORK - NSDCDPATPORTLINK_GEN_ALL_CORE
You can access the [facility], offered by [facility], by registering with the following website: [email or web address]

## 2019-04-04 NOTE — CONSULT NOTE ADULT - SUBJECTIVE AND OBJECTIVE BOX
CHIEF COMPLAINT: Leg pain    HPI: 37 yo M with HTN, HLD who presented for elective right hip hardware removal. Postoperatively patient was noted to be tachycardic, for which cardiology service was called. Patient      PAST MEDICAL & SURGICAL HISTORY:  Hyperlipidemia, HTN (hypertension): controlled  History of hip surgery: ORIF right hip  2017      Allergies    No Known Allergies    MEDICATIONS  (STANDING):  enoxaparin Injectable 40 milliGRAM(s) SubCutaneous every 24 hours  lactated ringers. 1000 milliLiter(s) (125 mL/Hr) IV Continuous <Continuous>  lactated ringers. 1000 milliLiter(s) (140 mL/Hr) IV Continuous <Continuous>  sodium chloride 0.9% lock flush 3 milliLiter(s) IV Push every 8 hours    MEDICATIONS  (PRN):  ketorolac   Injectable 15 milliGRAM(s) IV Push every 6 hours PRN Moderate Pain (4 - 6)  ondansetron Injectable 4 milliGRAM(s) IV Push every 6 hours PRN Nausea  oxyCODONE    5 mG/acetaminophen 325 mG 1 Tablet(s) Oral every 4 hours PRN Moderate Pain (4 - 6)  oxyCODONE    5 mG/acetaminophen 325 mG 2 Tablet(s) Oral every 6 hours PRN Severe Pain (7 - 10)      FAMILY HISTORY:  FH: breast cancer  FH: diabetes mellitus  FH: colon cancer    No family history of premature coronary artery disease or sudden cardiac death    SOCIAL HISTORY:  Smoking-  Alcohol-  Illicit Drug use-    REVIEW OF SYSTEMS:  Constitutional: [ ] fever, [ ]weight loss,  [ ]fatigue  Eyes: [ ] visual changes  Respiratory: [ ]shortness of breath;  [ ] cough, [ ]wheezing, [ ]chills, [ ]hemoptysis  Cardiovascular: [ ] chest pain, [ ]palpitations, [ ]dizziness,  [ ]leg swelling [ ]syncope  Gastrointestinal: [ ] abdominal pain, [ ]nausea, [ ]vomiting,  [ ]diarrhea   Genitourinary: [ ] dysuria, [ ] hematuria  Neurologic: [ ] headaches [ ] tremors  [ ] weakness [ ] lightheadedness  Skin: [ ] itching, [ ]burning, [ ] rashes  Endocrine: [ ] heat or cold intolerance  Musculoskeletal: [ ] joint pain or swelling; [ ] muscle, back, or extremity pain  Psychiatric: [ ] depression, [ ]anxiety, [ ]mood swings, or [ ]difficulty sleeping  Hematologic: [ ] easy bruising, [ ] bleeding gums       [ x] All others negative	  [ ] Unable to obtain    Vital Signs Last 24 Hrs  T(C): 37.1 (04 Apr 2019 10:30), Max: 37.1 (04 Apr 2019 10:30)  T(F): 98.7 (04 Apr 2019 10:30), Max: 98.7 (04 Apr 2019 10:30)  HR: 95 (04 Apr 2019 12:12) (90 - 101)  BP: 148/83 (04 Apr 2019 12:12) (115/68 - 148/83)  BP(mean): --  RR: 17 (04 Apr 2019 10:30) (16 - 17)  SpO2: 98% (04 Apr 2019 12:12) (96% - 99%)  I&O's Summary      PHYSICAL EXAM:  General: No acute distress  HEENT: EOMI, PERRL  Neck: Supple, No JVD  Lungs: Clear to auscultation bilaterally; No rales or wheezing  Heart: Regular rate and rhythm; No murmurs, rubs, or gallops  Abdomen: Nontender, bowel sounds present  Extremities: No clubbing, cyanosis, or edema  Nervous system:  Alert & Oriented X3, no focal deficits  Psychiatric: Normal affect  Skin: No rashes or lesions      LABS:  04-04    141  |  110<H>  |  13  ----------------------------<  95  3.9   |  27  |  0.88    Ca    8.1<L>      04 Apr 2019 08:11  Phos  1.9     04-03  Mg     1.8     04-03    TPro  5.5<L>  /  Alb  2.8<L>  /  TBili  0.3  /  DBili  x   /  AST  34  /  ALT  31  /  AlkPhos  45  04-03    Creatinine Trend: 0.88<--, 0.79<--, 0.90<--, 1.00<--, 1.09<--                        10.6   9.08  )-----------( 218      ( 04 Apr 2019 08:11 )             31.9     Lipid Panel:       HDL: 23  LDL: 97  Trigs: 84    Cardiac Enzymes: CARDIAC MARKERS ( 04 Apr 2019 08:11 )  x     / x     / 1449 U/L / x     / 1.5 ng/mL  CARDIAC MARKERS ( 03 Apr 2019 15:43 )  x     / x     / 1539 U/L / x     / 3.8 ng/mL  CARDIAC MARKERS ( 03 Apr 2019 07:20 )  <0.015 ng/mL / x     / 1459 U/L / x     / 5.1 ng/mL  CARDIAC MARKERS ( 03 Apr 2019 03:33 )  <0.015 ng/mL / x     / 1370 U/L / x     / 4.9 ng/mL    ECG [my interpretation]: 4/3/2019 @ 02:56: Sinus tachycardia at 102 bpm, low voltage, iRBBB    TELEMETRY:    ECHO: < from: Transthoracic Echocardiogram (04.03.19 @ 07:35) >  CONCLUSIONS:  1. Trace mitral regurgitation.  2. Normal left ventricular internal dimensions and wall  thicknesses.  3. Endocardium not well visualized; grossly normal left  ventricular systolic function.  4. Normal right ventricular size and function.    < end of copied text > CHIEF COMPLAINT: Leg pain    HPI: 39 yo M with HTN, HLD who presented for elective right hip hardware removal. Postoperatively patient was noted to be tachycardic, for which cardiology service was called. Patient reports he still feels pain postoperatively, the pain medication helps to suppress it for about 3 hours at a time. He denies any chest pain, dyspnea, palpitations, lightheadedness, or syncope. Before procedure was able to walk up 3 flights of stairs without issues.      PAST MEDICAL & SURGICAL HISTORY:  Hyperlipidemia, HTN (hypertension): controlled  History of hip surgery: ORIF right hip  2017      Allergies    No Known Allergies    MEDICATIONS  (STANDING):  enoxaparin Injectable 40 milliGRAM(s) SubCutaneous every 24 hours  lactated ringers. 1000 milliLiter(s) (125 mL/Hr) IV Continuous <Continuous>  lactated ringers. 1000 milliLiter(s) (140 mL/Hr) IV Continuous <Continuous>  sodium chloride 0.9% lock flush 3 milliLiter(s) IV Push every 8 hours    MEDICATIONS  (PRN):  ketorolac   Injectable 15 milliGRAM(s) IV Push every 6 hours PRN Moderate Pain (4 - 6)  ondansetron Injectable 4 milliGRAM(s) IV Push every 6 hours PRN Nausea  oxyCODONE    5 mG/acetaminophen 325 mG 1 Tablet(s) Oral every 4 hours PRN Moderate Pain (4 - 6)  oxyCODONE    5 mG/acetaminophen 325 mG 2 Tablet(s) Oral every 6 hours PRN Severe Pain (7 - 10)      FAMILY HISTORY:  FH: breast cancer  FH: diabetes mellitus  FH: colon cancer    No family history of premature coronary artery disease or sudden cardiac death    SOCIAL HISTORY:  Smoking-Denies  Alcohol-Rare  Illicit Drug use-Denies    REVIEW OF SYSTEMS:  Constitutional: [ ] fever, [ ]weight loss,  [ ]fatigue  Eyes: [ ] visual changes  Respiratory: [ ]shortness of breath;  [ ] cough, [ ]wheezing, [ ]chills, [ ]hemoptysis  Cardiovascular: [ ] chest pain, [ ]palpitations, [ ]dizziness,  [ ]leg swelling [ ]syncope  Gastrointestinal: [ ] abdominal pain, [ ]nausea, [ ]vomiting,  [ ]diarrhea   Genitourinary: [ ] dysuria, [ ] hematuria  Neurologic: [ ] headaches [ ] tremors  [ ] weakness [ ] lightheadedness  Skin: [ ] itching, [ ]burning, [ ] rashes  Endocrine: [ ] heat or cold intolerance  Musculoskeletal: [ ] joint pain or swelling; [ ] muscle, back, or extremity pain  Psychiatric: [ ] depression, [ ]anxiety, [ ]mood swings, or [ ]difficulty sleeping  Hematologic: [ ] easy bruising, [ ] bleeding gums       [ x] All others negative	  [ ] Unable to obtain    Vital Signs Last 24 Hrs  T(C): 37.1 (04 Apr 2019 10:30), Max: 37.1 (04 Apr 2019 10:30)  T(F): 98.7 (04 Apr 2019 10:30), Max: 98.7 (04 Apr 2019 10:30)  HR: 95 (04 Apr 2019 12:12) (90 - 101)  BP: 148/83 (04 Apr 2019 12:12) (115/68 - 148/83)  BP(mean): --  RR: 17 (04 Apr 2019 10:30) (16 - 17)  SpO2: 98% (04 Apr 2019 12:12) (96% - 99%)  I&O's Summary      PHYSICAL EXAM:  General: No acute distress  HEENT: EOMI, PERRL  Neck: Supple, No JVD  Lungs: Clear to auscultation bilaterally; No rales or wheezing  Heart: Regular rate and rhythm; No murmurs, rubs, or gallops  Abdomen: Nontender, bowel sounds present  Extremities: No clubbing, cyanosis, or edema  Nervous system:  Alert & Oriented X3, no focal deficits  Psychiatric: Normal affect  Skin: No rashes or lesions      LABS:  04-04    141  |  110<H>  |  13  ----------------------------<  95  3.9   |  27  |  0.88    Ca    8.1<L>      04 Apr 2019 08:11  Phos  1.9     04-03  Mg     1.8     04-03    TPro  5.5<L>  /  Alb  2.8<L>  /  TBili  0.3  /  DBili  x   /  AST  34  /  ALT  31  /  AlkPhos  45  04-03    Creatinine Trend: 0.88<--, 0.79<--, 0.90<--, 1.00<--, 1.09<--                        10.6   9.08  )-----------( 218      ( 04 Apr 2019 08:11 )             31.9     Lipid Panel:       HDL: 23  LDL: 97  Trigs: 84    Cardiac Enzymes: CARDIAC MARKERS ( 04 Apr 2019 08:11 )  x     / x     / 1449 U/L / x     / 1.5 ng/mL  CARDIAC MARKERS ( 03 Apr 2019 15:43 )  x     / x     / 1539 U/L / x     / 3.8 ng/mL  CARDIAC MARKERS ( 03 Apr 2019 07:20 )  <0.015 ng/mL / x     / 1459 U/L / x     / 5.1 ng/mL  CARDIAC MARKERS ( 03 Apr 2019 03:33 )  <0.015 ng/mL / x     / 1370 U/L / x     / 4.9 ng/mL    ECG [my interpretation]: 4/3/2019 @ 02:56: Sinus tachycardia at 102 bpm, low voltage, iRBBB    TELEMETRY: Sinus tachycardia, intermittent, occasionally NSR in 70s-80s    ECHO: < from: Transthoracic Echocardiogram (04.03.19 @ 07:35) >  CONCLUSIONS:  1. Trace mitral regurgitation.  2. Normal left ventricular internal dimensions and wall  thicknesses.  3. Endocardium not well visualized; grossly normal left  ventricular systolic function.  4. Normal right ventricular size and function.    < end of copied text >

## 2019-04-04 NOTE — CONSULT NOTE ADULT - ASSESSMENT
39 yo M with HTN, HLD who presented for elective right hip hardware removal with subsequent tachycardia    1. Tachycardia: Multifactorial in nature, likely physiologic due to combination of pain as well as anemia due to postoperative blood loss  -Echocardiogram was unremarkable  -EKG shows sinus tachycardia with isolated Q wave in III which is a normal variant    2. HTN: On amlodipine at home, has been held here thus far    3. HLD: On atorvastatin    ***Note that this is a preliminary note and any recommendations should NOT be carried out until this note is finalized. *** 39 yo M with HTN, HLD who presented for elective right hip hardware removal with subsequent tachycardia    1. Tachycardia: Multifactorial in nature, likely physiologic due to combination of pain as well as anemia due to postoperative blood loss. Denies dyspnea.   -Echocardiogram was unremarkable  -EKG shows sinus tachycardia with isolated Q wave in III which is a normal variant  -If symptoms persist, can follow up with cardiology on outpatient basis for further evaluation      2. HTN: On amlodipine at home, has been held here thus far    3. HLD: On atorvastatin

## 2019-04-04 NOTE — PROGRESS NOTE ADULT - SUBJECTIVE AND OBJECTIVE BOX
Pt Name: JUAN NUNEZREYES  MRN: [id number]    ORTHOPEDICS    Orthopedic diagnosis: Removal of hardware right femur POD#2    38yMale:  Pt seen and evaluated with Dr Angel this am at bedside.  no acute events overnight  pain controlled. pain of right hip 3/10 with rest.  pt feels better today.  s/p 2u prbc last night (total of 3u prbc on 4/3/2019)  Pt denies Chest pain, SOB, dyspnea, paresthesias, N/V/D, abdominal pain, syncope, or pain anywhere else.     T(C): 36.9 (04-04-19 @ 05:31), Max: 36.9 (04-03-19 @ 15:01)  HR: 90 (04-04-19 @ 05:31) (74 - 101)  BP: 120/63 (04-04-19 @ 05:31) (118/63 - 127/55)  RR: 17 (04-04-19 @ 05:31) (16 - 17)  SpO2: 96% (04-04-19 @ 05:31) (96% - 99%)    PHYSICAL EXAM:    Gen: well developed, well nourished, comfortable  Musculoskeletal:    [  X ] RIGHT    [   ] LEFT       [   ] UPPER EXTREMITY   [ X  ] LOWER EXTREMITY  Right hip dressing c/d/i  wound dressing changed today  wound staples intact. no dehiscence  no drainage, no erythema. skin pink and warm  compartments soft of RLE throughout  no ct, calves soft  2+ DP/PT with <2sec cap refill  silt  5/5 strength of ehl/ta/gs b/l    LABS:  pending    IMPRESSION: Pt is a  38y Male s/p removal of hardware right femur POD#2 with acute blood loss anemia    PLAN:  -  IV fluids hydration for elevated CK  -  monitor h/h, f/u labs  -  Pain control  -  DVT prophylaxis  -  Daily PT- NWB of the RLE with crutches  -  Case d/w

## 2019-04-04 NOTE — DISCHARGE NOTE PROVIDER - HOSPITAL COURSE
admitted for pain management and transient tachycardia    seen by medicine and cardiology. echo within normal limits    s/p total of 3 units prbc

## 2019-04-04 NOTE — DISCHARGE NOTE PROVIDER - NSDCCPCAREPLAN_GEN_ALL_CORE_FT
PRINCIPAL DISCHARGE DIAGNOSIS  Diagnosis: Encounter for removal of internal fixation device  Assessment and Plan of Treatment: Pain Management- See Attached Medication Reconciliation  Weight Bearing Status: WBAT of the Right hip  Equipment needs: Commode, Walker  Dressing: Please keep bandage/dressing Clean, Dry, and Intact.  Incision Site: REMOVE STAPLES on 4/17/2019  Dvt prophylaxis: D/C ASpirin in 1 month.   PT/Occupational Therapy are Activities of Daily Living as appropriate  Follow up with Orthopedic Surgeon after at:_[phone]  Please follow up with primary medical doctor within one week for cbc follow up  If symptoms of chest pain, palpitations, or syncope develop please return to ER.  Keep wound/bandage clean, dry and intact. Return to ER if Fever of 101 F or greater develops

## 2019-04-04 NOTE — DISCHARGE NOTE PROVIDER - CARE PROVIDER_API CALL
Dano Angel (MD)  Orthopaedic Surgery; Sports Medicine  [address] [facility], [address]  [place] [address]  Phone: [phone]  Fax: [phone]  Follow Up Time:

## 2019-04-04 NOTE — CONSULT NOTE ADULT - ATTENDING COMMENTS
Thank you for the courtesy of a consultation, please contact me for any additional questions
Discussed case with Dr Wilkins, the medical senior.  Agree with hx , assessment and plan as detailed above.  Young man with medical hx of HTN and HLD on medication s/p elective removal of metallic son in femoral shaft.  Report of significant blood loss with about 1500 cc fluid loss.  Post op asymptomatic tachycardia for which we are consulted  No hx of known cardiac arrhythmias, no known hyperthyroidism or pheochromocytoma    However, his baseline hg on 03/26/19 was 14.2 g/dl and post op had dropped to 9g/dl  Pt has been receiving IVF -hypotonic since discharge from PACU.    Vital Signs Last 24 Hrs  T(C): 37.2 (03 Apr 2019 06:45), Max: 37.2 (03 Apr 2019 06:45)  T(F): 99 (03 Apr 2019 06:45), Max: 99 (03 Apr 2019 06:45)  HR: 112 (03 Apr 2019 06:45) (72 - 114)  BP: 120/61 (03 Apr 2019 06:45) (103/61 - 139/86)  BP(mean): 86 (02 Apr 2019 20:30) (75 - 88)  RR: 18 (03 Apr 2019 06:45) (12 - 19)  SpO2: 98% (03 Apr 2019 06:45) (95% - 100%)    Telemetry assessment - HR ranging from upper 80s to 140s with upper limits occurring during activity.    Young man well built, NAD. AAO X 3  CTA B/L no crackels  RRR S1S2 with tachycardia  benign abdomen  No pedal edema    Labs                        9.3    16.51 )-----------( 268      ( 03 Apr 2019 03:33 )             28.3     04-03    138  |  108  |  12  ----------------------------<  156<H>  4.5   |  24  |  1.00    Ca    7.7<L>      03 Apr 2019 03:33  Phos  1.4     04-03  Mg     1.7     04-03    TPro  5.9<L>  /  Alb  2.9<L>  /  TBili  0.3  /  DBili  x   /  AST  36  /  ALT  34  /  AlkPhos  48  04-03    CPK - 1370    EKG - sinus tachycardia in 130s with low voltage QRS    Impression  38 year old man with hx of HTN and HL - unsure of compliance with post op tachycardia which on evaluation seem only to be due to related to large acute blood loss during surgery. There is no evidence of  PE or other form of embolism, no ACS.  It is however important to evaluate the heart in view of his medical hx of HTN and HL with an ECHO at least.  However, it still appears this tachycardia is a physiologically induced mechanism to deal with the acute blood/O2 carrying capacity loss with hgb drop from 14.2 to 9.3.     Plan  Change IVF to isotonic fluid and continue at 125 cc/ hour. Check for fluid overload  If no improvement by 7AM, would transfuse at least 1 unit of blood.  serial CK levels  Check ECHO  If tachycardia persist after these effort, would get cardiology consult.

## 2019-04-04 NOTE — CHART NOTE - NSCHARTNOTEFT_GEN_A_CORE
Orthopedics:    Results of the echocardiogram and final plan involving the discussion with the medical team, were mentioned and explained to the patient.  Pt with no complaints. Dr Angel agrees that pt may go home today and follow up as outpt.  Pt denies Chest pain, SOB, dyspnea, paresthesias, N/V/D, abdominal pain, syncope, or pain anywhere else.   All the patients questions were answered. Pt demonstrated their understanding of instructions with verbal readback.

## 2019-04-04 NOTE — PROGRESS NOTE ADULT - SUBJECTIVE AND OBJECTIVE BOX
Patient is a 38y old  Male who presents with a chief complaint of s/p removal of hardware right femur (2019 08:10)      INTERVAL HPI/OVERNIGHT EVENTS: no new complaints    MEDICATIONS  (STANDING):  enoxaparin Injectable 40 milliGRAM(s) SubCutaneous every 24 hours  lactated ringers. 1000 milliLiter(s) (125 mL/Hr) IV Continuous <Continuous>  lactated ringers. 1000 milliLiter(s) (140 mL/Hr) IV Continuous <Continuous>  sodium chloride 0.9% lock flush 3 milliLiter(s) IV Push every 8 hours    MEDICATIONS  (PRN):  ketorolac   Injectable 15 milliGRAM(s) IV Push every 6 hours PRN Moderate Pain (4 - 6)  ondansetron Injectable 4 milliGRAM(s) IV Push every 6 hours PRN Nausea  oxyCODONE    5 mG/acetaminophen 325 mG 1 Tablet(s) Oral every 4 hours PRN Moderate Pain (4 - 6)  oxyCODONE    5 mG/acetaminophen 325 mG 2 Tablet(s) Oral every 6 hours PRN Severe Pain (7 - 10)      __________________________________________________  REVIEW OF SYSTEMS:    CONSTITUTIONAL: No fever,   EYES: no acute visual disturbances  NECK: No pain or stiffness  RESPIRATORY: No cough; No shortness of breath  CARDIOVASCULAR: No chest pain, no palpitations  GASTROINTESTINAL: No pain. No nausea or vomiting; No diarrhea   NEUROLOGICAL: No headache or numbness, no tremors  MUSCULOSKELETAL: No joint pain, no muscle pain  GENITOURINARY: no dysuria, no frequency, no hesitancy  PSYCHIATRY: no depression , no anxiety  ALL OTHER  ROS negative        Vital Signs Last 24 Hrs  T(C): 37.1 (2019 10:30), Max: 37.1 (2019 10:30)  T(F): 98.7 (2019 10:30), Max: 98.7 (2019 10:30)  HR: 95 (2019 12:12) (74 - 101)  BP: 148/83 (2019 12:12) (115/68 - 148/83)  BP(mean): --  RR: 17 (2019 10:30) (16 - 17)  SpO2: 98% (2019 12:12) (96% - 99%)    ________________________________________________  PHYSICAL EXAM:  GENERAL: NAD  HEENT: Normocephalic;  conjunctivae and sclerae clear; moist mucous membranes;   NECK : supple  CHEST/LUNG: Clear to auscultation bilaterally with good air entry   HEART: S1 S2  regular; no murmurs, gallops or rubs  ABDOMEN: Soft, Nontender, Nondistended; Bowel sounds present  EXTREMITIES: no cyanosis; no edema; no calf tenderness  SKIN: warm and dry; no rash  NERVOUS SYSTEM:  Awake and alert; Oriented  to place, person and time ; no new deficits    _________________________________________________  LABS:                        10.6   9.08  )-----------( 218      ( 2019 08:11 )             31.9     04-04    141  |  110<H>  |  13  ----------------------------<  95  3.9   |  27  |  0.88    Ca    8.1<L>      2019 08:11  Phos  1.9     04-03  Mg     1.8     04-03    TPro  5.5<L>  /  Alb  2.8<L>  /  TBili  0.3  /  DBili  x   /  AST  34  /  ALT  31  /  AlkPhos  45  04-03      Urinalysis Basic - ( 2019 16:27 )    Color: Yellow / Appearance: Clear / S.010 / pH: x  Gluc: x / Ketone: Negative  / Bili: Negative / Urobili: Negative   Blood: x / Protein: Negative / Nitrite: Negative   Leuk Esterase: Negative / RBC: x / WBC x   Sq Epi: x / Non Sq Epi: x / Bacteria: x      CAPILLARY BLOOD GLUCOSE            RADIOLOGY & ADDITIONAL TESTS:    Imaging Personally Reviewed:  YES/NO    Consultant(s) Notes Reviewed:   YES/ No    Care Discussed with Consultants :     Plan of care was discussed with patient and /or primary care giver; all questions and concerns were addressed and care was aligned with patient's wishes.

## 2019-04-05 LAB — SURGICAL PATHOLOGY STUDY: SIGNIFICANT CHANGE UP

## 2019-05-02 NOTE — BRIEF OPERATIVE NOTE - URINE OUTPUT
0 Alert-The patient is alert, awake and responds to voice. The patient is oriented to time, place, and person. The triage nurse is able to obtain subjective information.

## 2019-07-10 PROCEDURE — 97161 PT EVAL LOW COMPLEX 20 MIN: CPT

## 2019-07-10 PROCEDURE — 82550 ASSAY OF CK (CPK): CPT

## 2019-07-10 PROCEDURE — 86850 RBC ANTIBODY SCREEN: CPT

## 2019-07-10 PROCEDURE — 36415 COLL VENOUS BLD VENIPUNCTURE: CPT

## 2019-07-10 PROCEDURE — 97116 GAIT TRAINING THERAPY: CPT

## 2019-07-10 PROCEDURE — 85027 COMPLETE CBC AUTOMATED: CPT

## 2019-07-10 PROCEDURE — C1889: CPT

## 2019-07-10 PROCEDURE — 36430 TRANSFUSION BLD/BLD COMPNT: CPT

## 2019-07-10 PROCEDURE — 93306 TTE W/DOPPLER COMPLETE: CPT

## 2019-07-10 PROCEDURE — 86900 BLOOD TYPING SEROLOGIC ABO: CPT

## 2019-07-10 PROCEDURE — 80061 LIPID PANEL: CPT

## 2019-07-10 PROCEDURE — 83735 ASSAY OF MAGNESIUM: CPT

## 2019-07-10 PROCEDURE — 84484 ASSAY OF TROPONIN QUANT: CPT

## 2019-07-10 PROCEDURE — 97530 THERAPEUTIC ACTIVITIES: CPT

## 2019-07-10 PROCEDURE — 81003 URINALYSIS AUTO W/O SCOPE: CPT

## 2019-07-10 PROCEDURE — 76000 FLUOROSCOPY <1 HR PHYS/QHP: CPT

## 2019-07-10 PROCEDURE — P9040: CPT

## 2019-07-10 PROCEDURE — 86901 BLOOD TYPING SEROLOGIC RH(D): CPT

## 2019-07-10 PROCEDURE — 86923 COMPATIBILITY TEST ELECTRIC: CPT

## 2019-07-10 PROCEDURE — 80048 BASIC METABOLIC PNL TOTAL CA: CPT

## 2019-07-10 PROCEDURE — 82553 CREATINE MB FRACTION: CPT

## 2019-07-10 PROCEDURE — 88300 SURGICAL PATH GROSS: CPT

## 2019-07-10 PROCEDURE — 97110 THERAPEUTIC EXERCISES: CPT

## 2019-07-10 PROCEDURE — 84100 ASSAY OF PHOSPHORUS: CPT

## 2019-07-10 PROCEDURE — 80053 COMPREHEN METABOLIC PANEL: CPT

## 2019-07-10 PROCEDURE — 84443 ASSAY THYROID STIM HORMONE: CPT

## 2019-07-10 PROCEDURE — 93005 ELECTROCARDIOGRAM TRACING: CPT

## 2019-07-10 PROCEDURE — 80307 DRUG TEST PRSMV CHEM ANLYZR: CPT

## 2019-08-07 NOTE — ASU PATIENT PROFILE, ADULT - AS SC [employer]
Muscle Strain in the Extremities  A muscle strain is a stretching and tearing of muscle fibers. This causes pain, especially when you move that muscle. There may also be some swelling and bruising.  Home care  · Keep the hurt area raised to reduce pain and swelling. This is especially important during the first 48 hours.  · Apply an ice pack over the injured area for 15 to 20 minutes every 3 to 6 hours. You should do this for the first 24 to 48 hours. You can make an ice pack by filling a plastic bag that seals at the top with ice cubes and then wrapping it with a thin towel. Be careful not to injure your skin with the ice treatments. Ice should never be applied directly to skin. Continue the use of ice packs for relief of pain and swelling as needed. After 48 hours, apply heat (warm shower or warm bath) for 15 to 20 minutes several times a day, or alternate ice and heat.  · You may use over-the-counter pain medicine to control pain, unless another medicine was prescribed. If you have chronic liver or kidney disease or ever had a stomach ulcer or GI bleeding, talk with your healthcare provider before using these medicines.  · For leg strains: If crutches have been recommended, don’t put full weight on the hurt leg until you can do so without pain. You can return to sports when you are able to hop and run on the injured leg without pain.  Follow-up care  Follow up with your healthcare provider, or as advised.  When to seek medical advice  Call your healthcare provider right away if any of these occur:  · The toes of the injured leg become swollen, cold, blue, numb, or tingly  · Pain or swelling increases  Date Last Reviewed: 11/19/2015 © 2000-2018 "[employer]. [address] [place] [address], [place] [address]. All rights reserved. This information is not intended as a substitute for professional medical care. Always follow your healthcare professional's instructions.        
(3) slightly limited

## 2024-02-09 NOTE — ASU PATIENT PROFILE, ADULT - HEALTH/HEALTHCARE ANXIETIES, PROFILE
L foot wound abcess culture, no polymorphonuclear cells seen per low power field, few gram + rods per oil power field none